# Patient Record
Sex: MALE | Race: BLACK OR AFRICAN AMERICAN | NOT HISPANIC OR LATINO | URBAN - METROPOLITAN AREA
[De-identification: names, ages, dates, MRNs, and addresses within clinical notes are randomized per-mention and may not be internally consistent; named-entity substitution may affect disease eponyms.]

---

## 2021-03-22 ENCOUNTER — OFFICE VISIT (OUTPATIENT)
Dept: URBAN - METROPOLITAN AREA CLINIC 70 | Facility: CLINIC | Age: 56
End: 2021-03-22

## 2021-03-22 RX ORDER — AMLODIPINE BESYLATE 10 MG/1
(NOTES: 1202744790309B) TABLET ORAL
Qty: 90 DELAYED RELEASE TABLET | Status: ACTIVE | COMMUNITY

## 2021-03-22 RX ORDER — LOSARTAN POTASSIUM 100 MG/1
(NOTES: 1202744787043D) TABLET, FILM COATED ORAL
Qty: 90 DELAYED RELEASE TABLET | Status: ACTIVE | COMMUNITY

## 2021-03-22 RX ORDER — IBUPROFEN 800 MG/1
(NOTES: 1203328904670A) TABLET ORAL
Qty: 90 DELAYED RELEASE TABLET | Status: ACTIVE | COMMUNITY

## 2021-03-22 RX ORDER — LOVASTATIN 20 MG/1
(NOTES: 1203086612248A) TABLET ORAL
Qty: 90 DELAYED RELEASE TABLET | Status: ACTIVE | COMMUNITY

## 2021-03-22 RX ORDER — OMEPRAZOLE 40 MG/1
(NOTES: 1203455522454D) CAPSULE, DELAYED RELEASE ORAL
Qty: 30 CAP | Status: ACTIVE | COMMUNITY

## 2021-04-13 ENCOUNTER — OFFICE VISIT (OUTPATIENT)
Dept: URBAN - METROPOLITAN AREA CLINIC 70 | Facility: CLINIC | Age: 56
End: 2021-04-13
Payer: COMMERCIAL

## 2021-04-13 ENCOUNTER — DASHBOARD ENCOUNTERS (OUTPATIENT)
Age: 56
End: 2021-04-13

## 2021-04-13 ENCOUNTER — WEB ENCOUNTER (OUTPATIENT)
Dept: URBAN - METROPOLITAN AREA CLINIC 70 | Facility: CLINIC | Age: 56
End: 2021-04-13

## 2021-04-13 ENCOUNTER — LAB OUTSIDE AN ENCOUNTER (OUTPATIENT)
Dept: URBAN - METROPOLITAN AREA CLINIC 70 | Facility: CLINIC | Age: 56
End: 2021-04-13

## 2021-04-13 VITALS
HEIGHT: 71 IN | BODY MASS INDEX: 32.2 KG/M2 | SYSTOLIC BLOOD PRESSURE: 193 MMHG | WEIGHT: 230 LBS | TEMPERATURE: 98.4 F | DIASTOLIC BLOOD PRESSURE: 130 MMHG | HEART RATE: 86 BPM

## 2021-04-13 DIAGNOSIS — Z12.11 SCREENING COLONOSCOPY: ICD-10-CM

## 2021-04-13 PROCEDURE — 99203 OFFICE O/P NEW LOW 30 MIN: CPT | Performed by: NURSE PRACTITIONER

## 2021-04-13 RX ORDER — LOSARTAN POTASSIUM 100 MG/1
(NOTES: 1202744787043D) TABLET, FILM COATED ORAL
Qty: 90 DELAYED RELEASE TABLET | COMMUNITY

## 2021-04-13 RX ORDER — LOVASTATIN 20 MG/1
(NOTES: 1203086612248A) TABLET ORAL
Qty: 90 DELAYED RELEASE TABLET | COMMUNITY

## 2021-04-13 RX ORDER — AMLODIPINE BESYLATE 10 MG/1
(NOTES: 1202744790309B) TABLET ORAL
Qty: 90 DELAYED RELEASE TABLET | COMMUNITY

## 2021-04-13 RX ORDER — IBUPROFEN 800 MG/1
(NOTES: 1203328904670A) TABLET ORAL
Qty: 90 DELAYED RELEASE TABLET | COMMUNITY

## 2021-04-13 RX ORDER — OMEPRAZOLE 40 MG/1
(NOTES: 1203455522454D) CAPSULE, DELAYED RELEASE ORAL
Qty: 30 CAP | COMMUNITY

## 2021-04-13 NOTE — HPI-TODAY'S VISIT:
Pt is a  55 yr old male whom presents today as a referral for a screening colonoscopy. Pt denies abdominal pain, hematochezia, melena, abnormal weight loss, or family hx of colon cancer. Pt has never had a colonoscopy.  Of note, pt's /130 this OV.  Pt reports he took his BP meds this AM and his BP has stayed high for a while.  Reports has been months since he saw his PCP.